# Patient Record
Sex: MALE | Race: BLACK OR AFRICAN AMERICAN | NOT HISPANIC OR LATINO | Employment: STUDENT | URBAN - METROPOLITAN AREA
[De-identification: names, ages, dates, MRNs, and addresses within clinical notes are randomized per-mention and may not be internally consistent; named-entity substitution may affect disease eponyms.]

---
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
## 2025-02-01 ENCOUNTER — OFFICE VISIT (OUTPATIENT)
Dept: URGENT CARE | Facility: CLINIC | Age: 20
End: 2025-02-01
Payer: MEDICAID

## 2025-02-01 VITALS
TEMPERATURE: 99 F | SYSTOLIC BLOOD PRESSURE: 111 MMHG | RESPIRATION RATE: 20 BRPM | WEIGHT: 208.75 LBS | OXYGEN SATURATION: 97 % | HEART RATE: 66 BPM | BODY MASS INDEX: 27.67 KG/M2 | HEIGHT: 73 IN | DIASTOLIC BLOOD PRESSURE: 49 MMHG

## 2025-02-01 DIAGNOSIS — M25.571 ACUTE RIGHT ANKLE PAIN: ICD-10-CM

## 2025-02-01 DIAGNOSIS — S93.401A SPRAIN OF RIGHT ANKLE, UNSPECIFIED LIGAMENT, INITIAL ENCOUNTER: Primary | ICD-10-CM

## 2025-02-01 PROCEDURE — 99203 OFFICE O/P NEW LOW 30 MIN: CPT | Mod: S$GLB,,,

## 2025-02-01 RX ORDER — IBUPROFEN 800 MG/1
800 TABLET ORAL 2 TIMES DAILY
Qty: 28 TABLET | Refills: 0 | Status: SHIPPED | OUTPATIENT
Start: 2025-02-01 | End: 2025-02-15

## 2025-02-01 NOTE — PROGRESS NOTES
"Subjective:      Patient ID: Tobin Antoine is a 19 y.o. male.    Vitals:  height is 6' 1" (1.854 m) and weight is 94.7 kg (208 lb 12.4 oz). His oral temperature is 98.6 °F (37 °C). His blood pressure is 111/49 (abnormal) and his pulse is 66. His respiration is 20 and oxygen saturation is 97%.     Chief Complaint: Ankle Injury    20 yo male presents with rt ankle px. Pt states he was playing basketball on last night when he rolled his ankle.     Provider note    20 yo M c/o right ankle pain, swelling after sustaining a fall while playing basketball yesterday where he rolled his right ankle. He was seen in the ED last night whom  did obtain ankle, foot, and tib/fib films indicating no fracture however mom and dad report it was shift change and taking a while to be seen so they left. He is able to bear weight with minimal pain.he has not taken anything for pain relief. Mom is requesting ibuprofen rx .    Ankle Injury   The incident occurred 12 to 24 hours ago. The incident occurred at the gym. The injury mechanism was a twisting injury. The pain is present in the right ankle. The quality of the pain is described as aching. The pain is at a severity of 6/10. The pain is moderate. The pain has been Constant since onset. Pertinent negatives include no numbness. He reports no foreign bodies present. The symptoms are aggravated by movement and weight bearing. He has tried ice and NSAIDs for the symptoms. The treatment provided no relief.       Musculoskeletal:  Positive for pain, trauma, joint pain and joint swelling.   Neurological:  Negative for numbness and tingling.      Objective:     Physical Exam   Constitutional: He is oriented to person, place, and time. normal  HENT:   Head: Normocephalic and atraumatic.   Eyes: Conjunctivae are normal.   Abdominal: Normal appearance.   Musculoskeletal: Normal range of motion.         General: Swelling present. Normal range of motion.      Right ankle: He exhibits swelling. He " exhibits normal range of motion, no ecchymosis, no deformity, no laceration and normal pulse. Tenderness. Lateral malleolus and medial malleolus tenderness found.      Right foot: Normal.   Neurological: He is alert and oriented to person, place, and time.   Skin: Skin is warm and dry. not right ankle      Assessment:     1. Sprain of right ankle, unspecified ligament, initial encounter    2. Acute right ankle pain        Plan:       Sprain of right ankle, unspecified ligament, initial encounter  -     BANDAGE ELASTIC 6IN ACE  -     ibuprofen (ADVIL,MOTRIN) 800 MG tablet; Take 1 tablet (800 mg total) by mouth 2 (two) times a day. for 14 days  Dispense: 28 tablet; Refill: 0    Acute right ankle pain  -     BANDAGE ELASTIC 6IN ACE      Patient Instructions   Please drink plenty of fluids.  Please get plenty of rest.  Please return here or go to the Emergency Department for any concerns or worsening of condition.  If you were prescribed a narcotic medication, do not drive or operate heavy equipment or machinery while taking these medications.  If you were not prescribed an anti-inflammatory medication, and if you do not have any history of stomach/intestinal ulcers, or kidney disease, or are not taking a blood thinner such as Coumadin, Plavix, Pradaxa, Eloquis, or Xaralta for example, it is OK to take over the counter Ibuprofen or Advil or Motrin or Aleve as directed.  Do not take these medications on an empty stomach.  Rest, ice, compression and elevation to the affected joint or limb as needed.  Please follow up with your primary care doctor or specialist as needed.    If you  smoke, please stop smoking.

## 2025-02-01 NOTE — LETTER
February 1, 2025      [facility] [address] [address]  Phone: [phone]  Fax: [phone]       Patient: Tobin Antoine   YOB: 2005  Date of Visit: 02/01/2025    To Whom It May Concern:    Mt Antoine  was at [facility] on 02/01/2025. The patient is healing from a right ankle sprain. If you have any questions or concerns, or if I can be of further assistance, please do not hesitate to contact me.    Sincerely,    Juana To NP